# Patient Record
Sex: MALE | Race: OTHER | NOT HISPANIC OR LATINO | URBAN - METROPOLITAN AREA
[De-identification: names, ages, dates, MRNs, and addresses within clinical notes are randomized per-mention and may not be internally consistent; named-entity substitution may affect disease eponyms.]

---

## 2023-06-25 ENCOUNTER — EMERGENCY (EMERGENCY)
Facility: HOSPITAL | Age: 53
LOS: 1 days | Discharge: HOME | End: 2023-06-25
Attending: STUDENT IN AN ORGANIZED HEALTH CARE EDUCATION/TRAINING PROGRAM
Payer: MEDICAID

## 2023-06-25 VITALS
OXYGEN SATURATION: 97 % | DIASTOLIC BLOOD PRESSURE: 100 MMHG | SYSTOLIC BLOOD PRESSURE: 154 MMHG | RESPIRATION RATE: 16 BRPM | WEIGHT: 195.11 LBS | HEART RATE: 82 BPM | TEMPERATURE: 98 F

## 2023-06-25 VITALS
OXYGEN SATURATION: 97 % | HEART RATE: 70 BPM | SYSTOLIC BLOOD PRESSURE: 152 MMHG | DIASTOLIC BLOOD PRESSURE: 101 MMHG | RESPIRATION RATE: 16 BRPM

## 2023-06-25 PROCEDURE — 99283 EMERGENCY DEPT VISIT LOW MDM: CPT

## 2023-06-25 PROCEDURE — 99282 EMERGENCY DEPT VISIT SF MDM: CPT

## 2023-06-25 RX ORDER — ACETAMINOPHEN 500 MG
975 TABLET ORAL ONCE
Refills: 0 | Status: COMPLETED | OUTPATIENT
Start: 2023-06-25 | End: 2023-06-25

## 2023-06-25 RX ORDER — IBUPROFEN 200 MG
400 TABLET ORAL ONCE
Refills: 0 | Status: COMPLETED | OUTPATIENT
Start: 2023-06-25 | End: 2023-06-25

## 2023-06-25 RX ADMIN — Medication 975 MILLIGRAM(S): at 13:12

## 2023-06-25 RX ADMIN — Medication 400 MILLIGRAM(S): at 13:13

## 2023-06-25 NOTE — ED STATDOCS - PROGRESS NOTE DETAILS
patient feeling improved after the medication, given and understands strict return precautions for worsening symptoms that would require imaging, patient will followup with pcp tomorrow, counselled on diet and exercise, will need pcp followup for long term BP management

## 2023-06-25 NOTE — ED STATDOCS - OBJECTIVE STATEMENT
51 y/o male with no pmhx, c/o gradual onset pressure headache x3 days. Pt went to PMD and told to have BP at 155. Denies hx of HTN. Pt was sent to ED for CT head. States never had a headache like this before. Denies fever, chest pain, sob, lightheadedness, nausea or vomiting. No numbness or tingling. Pt denies taking any medication today. Pt noted headache is worse when working ( in city[place] Pt has a f/u with PMD tomorrow AM. 51 y/o male with no pmhx, c/o gradual onset pressure headache to top of head x3 days. Pt went to PMD and told to have BP at 155. Denies hx of HTN. Pt was sent to ED for CT head. States never had a headache like this before. Denies fever, chest pain, sob, lightheadedness, nausea or vomiting. No numbness or tingling. Pt denies taking any medication today. Pt noted headache is worse when working ( in city). Pt has a f/u with PMD tomorrow AM.

## 2023-06-25 NOTE — ED STATDOCS - PHYSICAL EXAMINATION
PHYSICAL EXAM:   General: well-appearing, appears stated age, not in extremis   HEENT: NC/AT, PERRLA, conjunctiva pink, airway patent, +EOMI  Cardiovascular: regular rate and rhythm, + S1/S2, no murmurs, rubs, gallops appreciated  Respiratory: clear to auscultation bilaterally, good aeration bilaterally, nonlabored respirations  Abdominal: soft, nontender, nondistended, no rebound, guarding or rigidity  Back: no costovertebral tenderness, no rashes noted  Extremities: no LE edema b/l. Radial pulses equal and strong b/l  Neuro: Alert and oriented x3. CN2-12 intact, Strength 5/5 in upper and lower extremities. Sensation intact to light touch in upper and lower extremities. Reflexes 2+, Gait WNL, finger-to-nose and heel to shin and rapid alternating hand movements WNL.   Psychiatric: appropriate mood and affect.   Skin: appropriate color, warm, dry.   -Anastasia Quezada MD Attending Physician PHYSICAL EXAM:   General: well-appearing, appears stated age, not in extremis   HEENT: NC/AT, PERRLA, EOMI, airway patent, neck with full ROM  Cardiovascular: regular rate   Respiratory:  nonlabored respirations  Neuro: awake alert interactive. CN2-12 grossly intact, Strength 5/5 in upper and lower extremities. Sensation intact to light touch in upper and lower extremities. Gait WNL, finger-to-nose WNL.   Psychiatric: appropriate mood and affect.   -Anastasia Quezada MD Attending Physician

## 2023-06-25 NOTE — ED STATDOCS - PATIENT PORTAL LINK FT
You can access the FollowMyHealth Patient Portal offered by [facility] by registering at the following website: [email or web address]. By joining [facility]’s FollowMyHealth portal, you will also be able to view your health information using other applications (apps) compatible with our system.

## 2023-06-25 NOTE — ED ADULT NURSE NOTE - NSFALLUNIVINTERV_ED_ALL_ED
Bed/Stretcher in lowest position, wheels locked, appropriate side rails in place/Call bell, personal items and telephone in reach/Instruct patient to call for assistance before getting out of bed/chair/stretcher/Non-slip footwear applied when patient is off stretcher/[place] to call system/Physically safe environment - no spills, clutter or unnecessary equipment/Purposeful proactive rounding/Room/bathroom lighting operational, light cord in reach

## 2023-06-25 NOTE — ED STATDOCS - CLINICAL SUMMARY MEDICAL DECISION MAKING FREE TEXT BOX
hx and physical as noted above. Headache in setting of mildly elevated BP. No chest pain or SOB. Has not take any medication yet. No red flag, no thunder clap, headache was gradual onset, worse at work. Will medicate and reassess. No imagining needed at this time. hx and physical as noted above. Headache in setting of mildly elevated BP. No chest pain or SOB. Has not take any medication yet. No red flags, not thunder clap, headache was gradual onset, worse at work, no neuro symptoms. shared decision making with patient regarding CT in ED as not clinically indicated at this time. Will medicate and reassess. dispo and further interventions pending. hx and physical as noted above. Headache in setting of mildly elevated BP. No chest pain or SOB. Has not take any medication yet. No red flags, not thunder clap, headache was gradual onset, worse at work, no neuro symptoms. shared decision making with patient regarding CT in ED as not clinically indicated at this time. Will medicate and reassess. dispo and further interventions pending.    patient feeling improved after the medication, given and understands strict return precautions for worsening symptoms that would require imaging, patient will followup with pcp tomorrow, counselled on diet and exercise, will need pcp followup for long term BP management

## 2023-06-25 NOTE — ED ADULT NURSE NOTE - BEFAST EYES
Please call pt to schedule annual; refilled medication for 3 months  
Writer tried calling patient, her voicemail box is full.   
Writer tried contacting patient again today. Not able to leave message due to her voicemail box being full.   
No

## 2023-06-25 NOTE — ED STATDOCS - NSFOLLOWUPINSTRUCTIONS_ED_ALL_ED_FT
1. You were seen in the Emergency Room for headache and high blood pressure    2. You may take ACETAMINOPHEN and IBUPROFEN as directed on packaging. Always follow medication instructions and read medication side effects. Stop using these medications if you have any concerns     3. Please follow up with your doctor in 24-48 hours. Your doctor will need to monitor your blood pressure and you may need to be started on blood pressure medication, after a trial of improved diet and exercise.     4. Return to the emergency room or seek immediate assistance for any new or concerning symptoms (such as fevers, chills, worsening headache, vision changes, numbness, tingling, weakness, difficulty walking, confusion ), or if you get worse.     5. Copies of your tests were provided to you for follow-up.  You must address all your findings with your doctor.

## 2023-06-25 NOTE — ED STATDOCS - NS ED ROS FT
REVIEW OF SYSTEMS:  General:  no fever, no chills  HEENT: +headache, no vision changes  Cardiac: no chest pain, no palpitations  Respiratory:  no shortness of breath  Gastrointestinal:  no nausea, no vomiting  Neuro: no focal weakness, no numbness/tingling of the extremities, no decreased sensation, no lightheadedness   -Anastasia Quezada MD Attending Physician REVIEW OF SYSTEMS:  HEENT: +headache, no vision changes  Cardiac: no chest pain,  Respiratory:  no shortness of breath  Neuro: no focal weakness, no numbness/tingling of the extremities, no decreased sensation, no lightheadedness   -Anastasia Quezada MD Attending Physician

## 2024-03-05 ENCOUNTER — EMERGENCY (EMERGENCY)
Facility: HOSPITAL | Age: 54
LOS: 1 days | Discharge: HOME | End: 2024-03-05
Attending: EMERGENCY MEDICINE
Payer: SELF-PAY

## 2024-03-05 VITALS
TEMPERATURE: 98 F | HEIGHT: 66 IN | RESPIRATION RATE: 18 BRPM | WEIGHT: 223.55 LBS | OXYGEN SATURATION: 98 % | DIASTOLIC BLOOD PRESSURE: 100 MMHG | SYSTOLIC BLOOD PRESSURE: 153 MMHG | HEART RATE: 97 BPM

## 2024-03-05 PROCEDURE — 72131 CT LUMBAR SPINE W/O DYE: CPT | Mod: MC

## 2024-03-05 PROCEDURE — 99284 EMERGENCY DEPT VISIT MOD MDM: CPT

## 2024-03-05 PROCEDURE — 72131 CT LUMBAR SPINE W/O DYE: CPT | Mod: 26,MC

## 2024-03-05 PROCEDURE — 70450 CT HEAD/BRAIN W/O DYE: CPT | Mod: MC

## 2024-03-05 PROCEDURE — 99284 EMERGENCY DEPT VISIT MOD MDM: CPT | Mod: 25

## 2024-03-05 PROCEDURE — 70450 CT HEAD/BRAIN W/O DYE: CPT | Mod: 26,MC

## 2024-03-05 RX ORDER — METHOCARBAMOL 500 MG/1
750 TABLET, FILM COATED ORAL ONCE
Refills: 0 | Status: COMPLETED | OUTPATIENT
Start: 2024-03-05 | End: 2024-03-05

## 2024-03-05 RX ORDER — METHOCARBAMOL 500 MG/1
1 TABLET, FILM COATED ORAL
Qty: 20 | Refills: 0
Start: 2024-03-05 | End: 2024-03-09

## 2024-03-05 RX ADMIN — METHOCARBAMOL 750 MILLIGRAM(S): 500 TABLET, FILM COATED ORAL at 11:33

## 2024-03-05 NOTE — ED ADULT NURSE NOTE - OBJECTIVE STATEMENT
Assumed care of pt at this time. Pt A&Ox4 ambulatory c/o L lower leg pain with limited rom of the lower extremity and 4&5th toes. Denies iunjury, numbness/tingling, sensory loss, incontinence, co, sob. Airway patent. Respirations even and unlabored. Medications given as prescribed. Awaiting ct.

## 2024-03-05 NOTE — ED PROVIDER NOTE - OBJECTIVE STATEMENT
53-year-old male no past medical history comes to the ED with 5-day history of pain in the left leg with difficulty moving the fourth and fifth toe.  Patient denies any trauma or back pain.  Patient states pain starts in in the foot and then radiates up into his calf and lateral thigh area.

## 2024-03-05 NOTE — ED PROVIDER NOTE - TEMPLATE, MLM
General Libra Koch advised to fax orders over to Dr. Julien Weathers on our back fax line. Abdominal Pain, N/V/D

## 2024-03-05 NOTE — ED PROVIDER NOTE - CARE PLAN
Principal Discharge DX:	Sciatica   1 Principal Discharge DX:	Sciatica  Assessment and plan of treatment:	Rx for prednisone and Robaxin sent to pharmacy  Follow up with PCP within 1 week   Follow up with Spine within 1-2 weeks

## 2024-03-05 NOTE — ED ADULT TRIAGE NOTE - CHIEF COMPLAINT QUOTE
pt states he can not flex his left leg, started Thursday, denies pain or injury  A&Ox3, resp wnl, states" the last 2 toes don't work on left foot", dragging foot

## 2024-03-05 NOTE — ED PROVIDER NOTE - ATTENDING CONTRIBUTION TO CARE
Dr. Figueredo, Attending Physician-  I performed a face to face bedside interview with patient regarding history of present illness, review of symptoms and past medical history. I completed an independent physical exam.  I have discussed patient's plan of care with the resident.

## 2024-03-05 NOTE — ED PROVIDER NOTE - PATIENT PORTAL LINK FT
You can access the FollowMyHealth Patient Portal offered by [facility] by registering at the following website: [email or web address]. By joining [facility]’s FollowMyHealth portal, you will also be able to view your health information using other applications (apps) compatible with our system.

## 2024-03-05 NOTE — ED PROVIDER NOTE - CLINICAL SUMMARY MEDICAL DECISION MAKING FREE TEXT BOX
pain of left leg with weakness of 4th and 5th toes;  eval back vs central ;  ct and muscle relaxant and reassess

## 2024-03-05 NOTE — ED PROVIDER NOTE - PLAN OF CARE
Rx for prednisone and Robaxin sent to pharmacy  Follow up with PCP within 1 week   Follow up with Spine within 1-2 weeks
